# Patient Record
Sex: FEMALE | Race: WHITE | NOT HISPANIC OR LATINO
[De-identification: names, ages, dates, MRNs, and addresses within clinical notes are randomized per-mention and may not be internally consistent; named-entity substitution may affect disease eponyms.]

---

## 2018-03-08 ENCOUNTER — RESULT REVIEW (OUTPATIENT)
Age: 38
End: 2018-03-08

## 2018-03-08 ENCOUNTER — TRANSCRIPTION ENCOUNTER (OUTPATIENT)
Age: 38
End: 2018-03-08

## 2018-03-08 ENCOUNTER — INPATIENT (INPATIENT)
Facility: HOSPITAL | Age: 38
LOS: 0 days | Discharge: HOME | End: 2018-03-09
Attending: SURGERY | Admitting: SURGERY
Payer: COMMERCIAL

## 2018-03-08 VITALS
DIASTOLIC BLOOD PRESSURE: 72 MMHG | TEMPERATURE: 98 F | HEART RATE: 86 BPM | OXYGEN SATURATION: 100 % | RESPIRATION RATE: 22 BRPM | SYSTOLIC BLOOD PRESSURE: 117 MMHG

## 2018-03-08 DIAGNOSIS — K37 UNSPECIFIED APPENDICITIS: ICD-10-CM

## 2018-03-08 LAB
ALBUMIN SERPL ELPH-MCNC: 4.1 G/DL — SIGNIFICANT CHANGE UP (ref 3.3–5)
ALP SERPL-CCNC: 61 U/L — SIGNIFICANT CHANGE UP (ref 40–120)
ALT FLD-CCNC: 16 U/L — SIGNIFICANT CHANGE UP (ref 4–33)
AMYLASE P1 CFR SERPL: 45 U/L — SIGNIFICANT CHANGE UP (ref 25–125)
APPEARANCE UR: CLEAR — SIGNIFICANT CHANGE UP
APTT BLD: 29.1 SEC — SIGNIFICANT CHANGE UP (ref 27.5–37.4)
AST SERPL-CCNC: 16 U/L — SIGNIFICANT CHANGE UP (ref 4–32)
BASE EXCESS BLDV CALC-SCNC: -3.2 MMOL/L — SIGNIFICANT CHANGE UP
BASOPHILS # BLD AUTO: 0.05 K/UL — SIGNIFICANT CHANGE UP (ref 0–0.2)
BASOPHILS NFR BLD AUTO: 0.3 % — SIGNIFICANT CHANGE UP (ref 0–2)
BILIRUB SERPL-MCNC: 0.6 MG/DL — SIGNIFICANT CHANGE UP (ref 0.2–1.2)
BILIRUB UR-MCNC: NEGATIVE — SIGNIFICANT CHANGE UP
BLD GP AB SCN SERPL QL: NEGATIVE — SIGNIFICANT CHANGE UP
BLOOD GAS VENOUS - CREATININE: 0.52 MG/DL — SIGNIFICANT CHANGE UP (ref 0.5–1.3)
BLOOD UR QL VISUAL: HIGH
BUN SERPL-MCNC: 9 MG/DL — SIGNIFICANT CHANGE UP (ref 7–23)
CALCIUM SERPL-MCNC: 8.9 MG/DL — SIGNIFICANT CHANGE UP (ref 8.4–10.5)
CHLORIDE BLDV-SCNC: 109 MMOL/L — HIGH (ref 96–108)
CHLORIDE SERPL-SCNC: 103 MMOL/L — SIGNIFICANT CHANGE UP (ref 98–107)
CO2 SERPL-SCNC: 19 MMOL/L — LOW (ref 22–31)
COLOR SPEC: YELLOW — SIGNIFICANT CHANGE UP
CREAT SERPL-MCNC: 0.66 MG/DL — SIGNIFICANT CHANGE UP (ref 0.5–1.3)
EOSINOPHIL # BLD AUTO: 0.01 K/UL — SIGNIFICANT CHANGE UP (ref 0–0.5)
EOSINOPHIL NFR BLD AUTO: 0.1 % — SIGNIFICANT CHANGE UP (ref 0–6)
GAS PNL BLDV: 136 MMOL/L — SIGNIFICANT CHANGE UP (ref 136–146)
GLUCOSE BLDV-MCNC: 89 — SIGNIFICANT CHANGE UP (ref 70–99)
GLUCOSE SERPL-MCNC: 104 MG/DL — HIGH (ref 70–99)
GLUCOSE UR-MCNC: NEGATIVE — SIGNIFICANT CHANGE UP
HCG SERPL-ACNC: < 5 MIU/ML — SIGNIFICANT CHANGE UP
HCO3 BLDV-SCNC: 21 MMOL/L — SIGNIFICANT CHANGE UP (ref 20–27)
HCT VFR BLD CALC: 39.5 % — SIGNIFICANT CHANGE UP (ref 34.5–45)
HCT VFR BLDV CALC: 37.1 % — SIGNIFICANT CHANGE UP (ref 34.5–45)
HGB BLD-MCNC: 13.5 G/DL — SIGNIFICANT CHANGE UP (ref 11.5–15.5)
HGB BLDV-MCNC: 12 G/DL — SIGNIFICANT CHANGE UP (ref 11.5–15.5)
IMM GRANULOCYTES # BLD AUTO: 0.07 # — SIGNIFICANT CHANGE UP
IMM GRANULOCYTES NFR BLD AUTO: 0.5 % — SIGNIFICANT CHANGE UP (ref 0–1.5)
INR BLD: 0.98 — SIGNIFICANT CHANGE UP (ref 0.88–1.17)
KETONES UR-MCNC: SIGNIFICANT CHANGE UP
LACTATE BLDV-MCNC: 0.7 MMOL/L — SIGNIFICANT CHANGE UP (ref 0.5–2)
LEUKOCYTE ESTERASE UR-ACNC: NEGATIVE — SIGNIFICANT CHANGE UP
LIDOCAIN IGE QN: 13.7 U/L — SIGNIFICANT CHANGE UP (ref 7–60)
LYMPHOCYTES # BLD AUTO: 1.55 K/UL — SIGNIFICANT CHANGE UP (ref 1–3.3)
LYMPHOCYTES # BLD AUTO: 10.2 % — LOW (ref 13–44)
MCHC RBC-ENTMCNC: 31 PG — SIGNIFICANT CHANGE UP (ref 27–34)
MCHC RBC-ENTMCNC: 34.2 % — SIGNIFICANT CHANGE UP (ref 32–36)
MCV RBC AUTO: 90.6 FL — SIGNIFICANT CHANGE UP (ref 80–100)
MONOCYTES # BLD AUTO: 0.76 K/UL — SIGNIFICANT CHANGE UP (ref 0–0.9)
MONOCYTES NFR BLD AUTO: 5 % — SIGNIFICANT CHANGE UP (ref 2–14)
MUCOUS THREADS # UR AUTO: SIGNIFICANT CHANGE UP
NEUTROPHILS # BLD AUTO: 12.71 K/UL — HIGH (ref 1.8–7.4)
NEUTROPHILS NFR BLD AUTO: 83.9 % — HIGH (ref 43–77)
NITRITE UR-MCNC: NEGATIVE — SIGNIFICANT CHANGE UP
NRBC # FLD: 0 — SIGNIFICANT CHANGE UP
PCO2 BLDV: 37 MMHG — LOW (ref 41–51)
PH BLDV: 7.38 PH — SIGNIFICANT CHANGE UP (ref 7.32–7.43)
PH UR: 8.5 — HIGH (ref 4.6–8)
PLATELET # BLD AUTO: 292 K/UL — SIGNIFICANT CHANGE UP (ref 150–400)
PMV BLD: 11.1 FL — SIGNIFICANT CHANGE UP (ref 7–13)
PO2 BLDV: 41 MMHG — HIGH (ref 35–40)
POTASSIUM BLDV-SCNC: 3.3 MMOL/L — LOW (ref 3.4–4.5)
POTASSIUM SERPL-MCNC: 3.6 MMOL/L — SIGNIFICANT CHANGE UP (ref 3.5–5.3)
POTASSIUM SERPL-SCNC: 3.6 MMOL/L — SIGNIFICANT CHANGE UP (ref 3.5–5.3)
PROT SERPL-MCNC: 7.1 G/DL — SIGNIFICANT CHANGE UP (ref 6–8.3)
PROT UR-MCNC: 20 MG/DL — SIGNIFICANT CHANGE UP
PROTHROM AB SERPL-ACNC: 11.3 SEC — SIGNIFICANT CHANGE UP (ref 9.8–13.1)
RBC # BLD: 4.36 M/UL — SIGNIFICANT CHANGE UP (ref 3.8–5.2)
RBC # FLD: 12.6 % — SIGNIFICANT CHANGE UP (ref 10.3–14.5)
RBC CASTS # UR COMP ASSIST: >50 — HIGH (ref 0–?)
RH IG SCN BLD-IMP: POSITIVE — SIGNIFICANT CHANGE UP
SAO2 % BLDV: 75.2 % — SIGNIFICANT CHANGE UP (ref 60–85)
SODIUM SERPL-SCNC: 137 MMOL/L — SIGNIFICANT CHANGE UP (ref 135–145)
SP GR SPEC: 1.02 — SIGNIFICANT CHANGE UP (ref 1–1.04)
SQUAMOUS # UR AUTO: SIGNIFICANT CHANGE UP
UROBILINOGEN FLD QL: NORMAL MG/DL — SIGNIFICANT CHANGE UP
WBC # BLD: 15.15 K/UL — HIGH (ref 3.8–10.5)
WBC # FLD AUTO: 15.15 K/UL — HIGH (ref 3.8–10.5)
WBC UR QL: SIGNIFICANT CHANGE UP (ref 0–?)

## 2018-03-08 PROCEDURE — 88304 TISSUE EXAM BY PATHOLOGIST: CPT | Mod: 26

## 2018-03-08 PROCEDURE — 74177 CT ABD & PELVIS W/CONTRAST: CPT | Mod: 26

## 2018-03-08 PROCEDURE — 44970 LAPAROSCOPY APPENDECTOMY: CPT

## 2018-03-08 RX ORDER — ALPRAZOLAM 0.25 MG
1 TABLET ORAL
Qty: 0 | Refills: 0 | COMMUNITY

## 2018-03-08 RX ORDER — MORPHINE SULFATE 50 MG/1
4 CAPSULE, EXTENDED RELEASE ORAL ONCE
Qty: 0 | Refills: 0 | Status: DISCONTINUED | OUTPATIENT
Start: 2018-03-08 | End: 2018-03-08

## 2018-03-08 RX ORDER — ONDANSETRON 8 MG/1
4 TABLET, FILM COATED ORAL ONCE
Qty: 0 | Refills: 0 | Status: COMPLETED | OUTPATIENT
Start: 2018-03-08 | End: 2018-03-08

## 2018-03-08 RX ORDER — SERTRALINE 25 MG/1
1 TABLET, FILM COATED ORAL
Qty: 0 | Refills: 0 | COMMUNITY

## 2018-03-08 RX ORDER — IBUPROFEN 200 MG
1 TABLET ORAL
Qty: 0 | Refills: 0 | COMMUNITY

## 2018-03-08 RX ORDER — NORETHINDRONE AND ETHINYL ESTRADIOL 0.4-0.035
1 KIT ORAL
Qty: 0 | Refills: 0 | COMMUNITY

## 2018-03-08 RX ORDER — SODIUM CHLORIDE 9 MG/ML
1000 INJECTION INTRAMUSCULAR; INTRAVENOUS; SUBCUTANEOUS ONCE
Qty: 0 | Refills: 0 | Status: COMPLETED | OUTPATIENT
Start: 2018-03-08 | End: 2018-03-08

## 2018-03-08 RX ORDER — HEPARIN SODIUM 5000 [USP'U]/ML
5000 INJECTION INTRAVENOUS; SUBCUTANEOUS EVERY 8 HOURS
Qty: 0 | Refills: 0 | Status: DISCONTINUED | OUTPATIENT
Start: 2018-03-08 | End: 2018-03-09

## 2018-03-08 RX ORDER — SODIUM CHLORIDE 9 MG/ML
1000 INJECTION, SOLUTION INTRAVENOUS
Qty: 0 | Refills: 0 | Status: DISCONTINUED | OUTPATIENT
Start: 2018-03-08 | End: 2018-03-09

## 2018-03-08 RX ORDER — ALBUTEROL 90 UG/1
2 AEROSOL, METERED ORAL
Qty: 0 | Refills: 0 | COMMUNITY

## 2018-03-08 RX ORDER — OXYCODONE AND ACETAMINOPHEN 5; 325 MG/1; MG/1
1 TABLET ORAL EVERY 4 HOURS
Qty: 0 | Refills: 0 | Status: DISCONTINUED | OUTPATIENT
Start: 2018-03-08 | End: 2018-03-09

## 2018-03-08 RX ORDER — ONDANSETRON 8 MG/1
4 TABLET, FILM COATED ORAL ONCE
Qty: 0 | Refills: 0 | Status: COMPLETED | OUTPATIENT
Start: 2018-03-08 | End: 2018-03-09

## 2018-03-08 RX ORDER — HYDROMORPHONE HYDROCHLORIDE 2 MG/ML
0.5 INJECTION INTRAMUSCULAR; INTRAVENOUS; SUBCUTANEOUS
Qty: 0 | Refills: 0 | Status: DISCONTINUED | OUTPATIENT
Start: 2018-03-08 | End: 2018-03-09

## 2018-03-08 RX ORDER — PIPERACILLIN AND TAZOBACTAM 4; .5 G/20ML; G/20ML
3.38 INJECTION, POWDER, LYOPHILIZED, FOR SOLUTION INTRAVENOUS ONCE
Qty: 0 | Refills: 0 | Status: COMPLETED | OUTPATIENT
Start: 2018-03-08 | End: 2018-03-08

## 2018-03-08 RX ADMIN — MORPHINE SULFATE 4 MILLIGRAM(S): 50 CAPSULE, EXTENDED RELEASE ORAL at 18:13

## 2018-03-08 RX ADMIN — MORPHINE SULFATE 4 MILLIGRAM(S): 50 CAPSULE, EXTENDED RELEASE ORAL at 16:28

## 2018-03-08 RX ADMIN — HYDROMORPHONE HYDROCHLORIDE 0.5 MILLIGRAM(S): 2 INJECTION INTRAMUSCULAR; INTRAVENOUS; SUBCUTANEOUS at 23:30

## 2018-03-08 RX ADMIN — SODIUM CHLORIDE 75 MILLILITER(S): 9 INJECTION, SOLUTION INTRAVENOUS at 23:21

## 2018-03-08 RX ADMIN — HEPARIN SODIUM 5000 UNIT(S): 5000 INJECTION INTRAVENOUS; SUBCUTANEOUS at 23:19

## 2018-03-08 RX ADMIN — MORPHINE SULFATE 4 MILLIGRAM(S): 50 CAPSULE, EXTENDED RELEASE ORAL at 12:05

## 2018-03-08 RX ADMIN — MORPHINE SULFATE 4 MILLIGRAM(S): 50 CAPSULE, EXTENDED RELEASE ORAL at 13:10

## 2018-03-08 RX ADMIN — MORPHINE SULFATE 4 MILLIGRAM(S): 50 CAPSULE, EXTENDED RELEASE ORAL at 17:58

## 2018-03-08 RX ADMIN — PIPERACILLIN AND TAZOBACTAM 200 GRAM(S): 4; .5 INJECTION, POWDER, LYOPHILIZED, FOR SOLUTION INTRAVENOUS at 15:44

## 2018-03-08 RX ADMIN — MORPHINE SULFATE 4 MILLIGRAM(S): 50 CAPSULE, EXTENDED RELEASE ORAL at 15:30

## 2018-03-08 RX ADMIN — ONDANSETRON 4 MILLIGRAM(S): 8 TABLET, FILM COATED ORAL at 11:46

## 2018-03-08 RX ADMIN — HYDROMORPHONE HYDROCHLORIDE 0.5 MILLIGRAM(S): 2 INJECTION INTRAMUSCULAR; INTRAVENOUS; SUBCUTANEOUS at 23:16

## 2018-03-08 RX ADMIN — MORPHINE SULFATE 4 MILLIGRAM(S): 50 CAPSULE, EXTENDED RELEASE ORAL at 11:46

## 2018-03-08 RX ADMIN — SODIUM CHLORIDE 1000 MILLILITER(S): 9 INJECTION INTRAMUSCULAR; INTRAVENOUS; SUBCUTANEOUS at 11:46

## 2018-03-08 NOTE — H&P ADULT - NSHPPHYSICALEXAM_GEN_ALL_CORE
PHYSICAL EXAM:  GENERAL: NAD, well-developed  HEAD:  Atraumatic, Normocephalic  EYES: EOMI, PERRLA, conjunctiva and sclera clear  NECK: Supple, No JVD  CHEST/LUNG: Clear to auscultation bilaterally; No wheeze  HEART: Regular rate and rhythm; No murmurs, rubs, or gallops  ABDOMEN: Soft, tender in the suprapubic area and RLQ  EXTREMITIES:  2+ Peripheral Pulses, No clubbing, cyanosis, or edema  PSYCH: AAOx3  NEUROLOGY: non-focal  SKIN: No rashes or lesions

## 2018-03-08 NOTE — H&P ADULT - HISTORY OF PRESENT ILLNESS
Patient has had pain since 11pm last night and nausea with gagging and small nonbilious emesis. Pain precedes nausea, no fevers or chills.  No prior episodes of pain.  She has no complaints of dysuria, urgency Patient is a 36 yo female with no spmh presenting with abdominal pain since 11pm last night and nausea with gagging and small nonbilious emesis. Pain precedes nausea, no fevers or chills.  No prior episodes of pain.  She has no complaints of dysuria, urgency.  She has a hx of hematuria which previous workup revealed nothing significant.  She has had no fevers, chills, recent travel, or sick contacts.

## 2018-03-08 NOTE — ED ADULT NURSE NOTE - OBJECTIVE STATEMENT
Patient presented to ED A&O x4, with c/o abdominal pain since last night, denies any N/V/D, Blood work drawn and sent to lab.  ER physician evaluated patient, medication ordered and administered.  Family present at the bedside. Will continue to monitor patient closely. Arlet GRACE

## 2018-03-08 NOTE — ED PROVIDER NOTE - NONTENDER LOCATION
left lower quadrant/umbilical/suprapubic/periumbilical/left costovertebral angle/left upper quadrant/right upper quadrant/right costovertebral angle/right lower quadrant

## 2018-03-08 NOTE — DISCHARGE NOTE ADULT - CARE PROVIDER_API CALL
Raffi Duque), Surgery; Surgical Critical Care  [address] [place] [address]  Phone: [phone]  Fax: [phone]

## 2018-03-08 NOTE — ED PROVIDER NOTE - OBJECTIVE STATEMENT
36 y/o F with h/o ocp use and irreg periods p/w sudden onset of diffuse abd pain which started in luq and then wrapped around periumbilical region, since 11 pm last night , no nausea, vomiting, diarrhea, fever, chills, dysuria, hematuria. Pt denies nay pelvic pain, denies pregnancy. Pt states that however, pain is equivalent to labor pain. No chest pain, fall or trauma. Pt got very anxious this morning and took some xanax without relief

## 2018-03-08 NOTE — H&P ADULT - NSHPREVIEWOFSYSTEMS_GEN_ALL_CORE
Constitutional: No fever, fatigue or weight loss.  Skin: No rash.  Eyes: No recent vision problems or eye pain.  ENT: No congestion, ear pain, or sore throat.  Endocrine: No thyroid problems.  Cardiovascular: No chest pain or palpation.  Respiratory: No cough, shortness of breath, congestion, or wheezing.  Gastrointestinal: as above  Genitourinary: No dysuria.  Musculoskeletal: No joint swelling.  Neurologic: No headache.

## 2018-03-08 NOTE — H&P ADULT - NSHPLABSRESULTS_GEN_ALL_CORE
13.5   15.15 )-----------( 292      ( 08 Mar 2018 11:40 )             39.5       03-08    137  |  103  |  9   ----------------------------<  104<H>  3.6   |  19<L>  |  0.66    Ca    8.9      08 Mar 2018 11:40    TPro  7.1  /  Alb  4.1  /  TBili  0.6  /  DBili  x   /  AST  16  /  ALT  16  /  AlkPhos  61  03-08      < from: CT Abdomen and Pelvis w/ IV Cont (03.08.18 @ 13:02) >    BOWEL: Normal in course and caliber without obstruction. The appendix is   borderline in size measuring 6 to 7 mm with mild mucosal enhancement but   no adjacent fatty stranding.  PERITONEUM/RETROPERITONEUM: No pneumoperitoneum, ascites, or   lymphadenopathy.  VESSELS:  No evidenceof aortic aneurysm.      BONES/SOFT TISSUES: No abnormality.    IMPRESSION:  Borderline appearance of appendix. If patient is focally tender in the   right lower quadrant, then early acute appendicitis is a consideration.  No other abnormality in theabdomen and pelvis.      < end of copied text >

## 2018-03-08 NOTE — BRIEF OPERATIVE NOTE - PROCEDURE
<<-----Click on this checkbox to enter Procedure Laparoscopic appendectomy  03/08/2018    Active  JANICE

## 2018-03-08 NOTE — H&P ADULT - ASSESSMENT
Patient is a 37 year old F with early acute appendicitis  -IV antibiotics  -preop labs  -npo/IVF  -consented for laparoscopic appendectomy    C Brennan SABILLON PGYII [id number] B Team Sx

## 2018-03-08 NOTE — ED PROVIDER NOTE - MEDICAL DECISION MAKING DETAILS
given the sudden onset of pain and severity wihtout focal tenderness, ddx ncludes kidney stone vs avm rupture vs ovarin cyst rupture, will check labs, lipase, ua, ucg and do ct abd / pelvis with iv contrast,, given morphine for pain relief

## 2018-03-08 NOTE — DISCHARGE NOTE ADULT - HOSPITAL COURSE
39 y/o F with no PMHx who presented with RLQ pain and was found to have signs concerning for acute appendicitis on CT scan. Patient was taken to the OR on 3/8 and underwent an uncomplicated laparoscopic appendectomy and tolerated it well. Following the procedure her pain was well controlled, she was tolerating PO intake, was able to ambulate independently and was deemed stable for discharge. Patient to follow up with Dr. Duque in 2 weeks.

## 2018-03-08 NOTE — DISCHARGE NOTE ADULT - PATIENT PORTAL LINK FT
You can access the [facility] Patient Portal, offered by [facility], by registering with the following website: [email or web address]

## 2018-03-08 NOTE — DISCHARGE NOTE ADULT - MEDICATION SUMMARY - MEDICATIONS TO TAKE
I will START or STAY ON the medications listed below when I get home from the hospital:    Advil 200 mg oral tablet  -- 1 tab(s) by mouth every 6 hours, As Needed  -- Indication: For mild pain    Percocet 5/325 oral tablet  -- 1 tab(s) by mouth every 6 hours, As Needed -for severe pain MDD:4  -- Caution federal law prohibits the transfer of this drug to any person other  than the person for whom it was prescribed.  May cause drowsiness.  Alcohol may intensify this effect.  Use care when operating dangerous machinery.  This prescription cannot be refilled.  This product contains acetaminophen.  Do not use  with any other product containing acetaminophen to prevent possible liver damage.  Using more of this medication than prescribed may cause serious breathing problems.    -- Indication: For severe pain    Zoloft 25 mg oral tablet  -- 1 tab(s) by mouth every other day  -- Indication: For home med    ALPRAZolam 0.5 mg oral tablet  -- 1 tab(s) by mouth once a day, As Needed  -- Indication: For home med    albuterol 90 mcg/inh inhalation aerosol  -- 2 puff(s) inhaled 4 times a day, As Needed  -- Indication: For home med    Minastrin 24 Fe oral tablet, chewable  -- 1 tab(s) by mouth once a day  -- Indication: For home med

## 2018-03-08 NOTE — DISCHARGE NOTE ADULT - CARE PLAN
Principal Discharge DX:	Acute appendicitis, unspecified acute appendicitis type  Goal:	Recover from surgery  Assessment and plan of treatment:	Follow up with Dr. Duque 2 weeks from surgery. Remove outer dressing after 24 hours. Can shower then allowing water to run over steri strips, pat dry afterwards and leave on until they fall off. Do not submerge incision. Pain medications have been sent to your pharmacy. Principal Discharge DX:	Acute appendicitis, unspecified acute appendicitis type  Goal:	Recover from surgery  Assessment and plan of treatment:	Follow up with Dr. Duque 2 weeks from surgery. Can shower after 24 hours. Do not submerge incision. Pain medications have been sent to your pharmacy.

## 2018-03-08 NOTE — DISCHARGE NOTE ADULT - PLAN OF CARE
Recover from surgery Follow up with Dr. Duque 2 weeks from surgery. Remove outer dressing after 24 hours. Can shower then allowing water to run over steri strips, pat dry afterwards and leave on until they fall off. Do not submerge incision. Pain medications have been sent to your pharmacy. Follow up with Dr. Duque 2 weeks from surgery. Can shower after 24 hours. Do not submerge incision. Pain medications have been sent to your pharmacy.

## 2018-03-09 ENCOUNTER — TRANSCRIPTION ENCOUNTER (OUTPATIENT)
Age: 38
End: 2018-03-09

## 2018-03-09 VITALS
OXYGEN SATURATION: 99 % | HEART RATE: 78 BPM | DIASTOLIC BLOOD PRESSURE: 72 MMHG | RESPIRATION RATE: 12 BRPM | SYSTOLIC BLOOD PRESSURE: 114 MMHG | TEMPERATURE: 98 F

## 2018-03-09 RX ADMIN — ONDANSETRON 4 MILLIGRAM(S): 8 TABLET, FILM COATED ORAL at 00:33

## 2018-03-22 ENCOUNTER — APPOINTMENT (OUTPATIENT)
Dept: TRAUMA SURGERY | Facility: CLINIC | Age: 38
End: 2018-03-22
Payer: COMMERCIAL

## 2018-03-22 VITALS
DIASTOLIC BLOOD PRESSURE: 82 MMHG | WEIGHT: 122 LBS | BODY MASS INDEX: 19.61 KG/M2 | TEMPERATURE: 98.5 F | HEIGHT: 66 IN | HEART RATE: 94 BPM | SYSTOLIC BLOOD PRESSURE: 118 MMHG

## 2018-03-22 PROCEDURE — 99024 POSTOP FOLLOW-UP VISIT: CPT

## 2018-04-12 ENCOUNTER — APPOINTMENT (OUTPATIENT)
Dept: TRAUMA SURGERY | Facility: CLINIC | Age: 38
End: 2018-04-12
Payer: COMMERCIAL

## 2018-04-12 VITALS
TEMPERATURE: 98 F | HEIGHT: 66 IN | WEIGHT: 125 LBS | HEART RATE: 93 BPM | SYSTOLIC BLOOD PRESSURE: 107 MMHG | DIASTOLIC BLOOD PRESSURE: 77 MMHG | BODY MASS INDEX: 20.09 KG/M2

## 2018-04-12 PROCEDURE — 99024 POSTOP FOLLOW-UP VISIT: CPT

## 2021-04-22 ENCOUNTER — RESULT REVIEW (OUTPATIENT)
Age: 41
End: 2021-04-22

## 2021-11-11 ENCOUNTER — APPOINTMENT (OUTPATIENT)
Dept: ULTRASOUND IMAGING | Facility: CLINIC | Age: 41
End: 2021-11-11
Payer: COMMERCIAL

## 2021-11-11 ENCOUNTER — OUTPATIENT (OUTPATIENT)
Dept: OUTPATIENT SERVICES | Facility: HOSPITAL | Age: 41
LOS: 1 days | End: 2021-11-11
Payer: COMMERCIAL

## 2021-11-11 DIAGNOSIS — Z00.8 ENCOUNTER FOR OTHER GENERAL EXAMINATION: ICD-10-CM

## 2021-11-11 PROCEDURE — 76770 US EXAM ABDO BACK WALL COMP: CPT | Mod: 26

## 2021-11-11 PROCEDURE — 76770 US EXAM ABDO BACK WALL COMP: CPT

## 2022-03-29 ENCOUNTER — APPOINTMENT (OUTPATIENT)
Dept: ORTHOPEDIC SURGERY | Facility: CLINIC | Age: 42
End: 2022-03-29
Payer: COMMERCIAL

## 2022-03-29 ENCOUNTER — NON-APPOINTMENT (OUTPATIENT)
Age: 42
End: 2022-03-29

## 2022-03-29 VITALS
HEIGHT: 66 IN | WEIGHT: 135 LBS | SYSTOLIC BLOOD PRESSURE: 111 MMHG | BODY MASS INDEX: 21.69 KG/M2 | DIASTOLIC BLOOD PRESSURE: 73 MMHG | HEART RATE: 75 BPM

## 2022-03-29 DIAGNOSIS — M25.569 PAIN IN UNSPECIFIED KNEE: ICD-10-CM

## 2022-03-29 PROCEDURE — 99202 OFFICE O/P NEW SF 15 MIN: CPT | Mod: 25

## 2022-03-29 PROCEDURE — 73562 X-RAY EXAM OF KNEE 3: CPT | Mod: 50

## 2022-03-29 PROCEDURE — 20610 DRAIN/INJ JOINT/BURSA W/O US: CPT | Mod: 50

## 2022-03-29 RX ORDER — LIDOCAINE HYDROCHLORIDE 10 MG/ML
1 INJECTION, SOLUTION INFILTRATION; PERINEURAL
Refills: 0 | Status: COMPLETED | OUTPATIENT
Start: 2022-03-29

## 2022-03-29 RX ORDER — METHYLPRED ACET/NACL,ISO-OS/PF 80 MG/ML
80 VIAL (ML) INJECTION
Qty: 1 | Refills: 0 | Status: COMPLETED | OUTPATIENT
Start: 2022-03-29

## 2022-03-29 RX ORDER — NORETHINDRONE 0.35 MG/1
0.35 TABLET ORAL
Qty: 84 | Refills: 0 | Status: DISCONTINUED | COMMUNITY
Start: 2021-08-02

## 2022-03-29 RX ORDER — METHYLPREDNISOLONE 4 MG/1
4 TABLET ORAL
Qty: 21 | Refills: 0 | Status: DISCONTINUED | COMMUNITY
Start: 2022-01-14

## 2022-03-29 RX ORDER — RIMEGEPANT SULFATE 75 MG/75MG
75 TABLET, ORALLY DISINTEGRATING ORAL
Qty: 8 | Refills: 0 | Status: ACTIVE | COMMUNITY
Start: 2022-03-15

## 2022-03-29 RX ORDER — CLOBETASOL PROPIONATE 0.5 MG/G
0.05 OINTMENT TOPICAL
Qty: 15 | Refills: 0 | Status: ACTIVE | COMMUNITY
Start: 2021-12-27

## 2022-03-29 RX ADMIN — LIDOCAINE HYDROCHLORIDE 3 %: 10 INJECTION, SOLUTION INFILTRATION; PERINEURAL at 00:00

## 2022-03-29 RX ADMIN — METHYLPREDNISOLONE ACETATE 1 MG/ML: 80 INJECTION, SUSPENSION INTRA-ARTICULAR; INTRALESIONAL; INTRAMUSCULAR; SOFT TISSUE at 00:00

## 2022-04-26 ENCOUNTER — APPOINTMENT (OUTPATIENT)
Dept: ORTHOPEDIC SURGERY | Facility: CLINIC | Age: 42
End: 2022-04-26
Payer: COMMERCIAL

## 2022-04-26 VITALS — SYSTOLIC BLOOD PRESSURE: 113 MMHG | DIASTOLIC BLOOD PRESSURE: 82 MMHG | HEART RATE: 71 BPM

## 2022-04-26 PROCEDURE — 99213 OFFICE O/P EST LOW 20 MIN: CPT

## 2022-04-26 RX ORDER — HYLAN G-F 20 16MG/2ML
48 SYRINGE (ML) INTRAARTICULAR
Qty: 2 | Refills: 0 | Status: ACTIVE | OUTPATIENT
Start: 2022-04-26

## 2022-04-27 ENCOUNTER — NON-APPOINTMENT (OUTPATIENT)
Age: 42
End: 2022-04-27

## 2022-05-23 ENCOUNTER — RX RENEWAL (OUTPATIENT)
Age: 42
End: 2022-05-23

## 2022-05-23 RX ORDER — MELOXICAM 15 MG/1
15 TABLET ORAL
Qty: 30 | Refills: 0 | Status: ACTIVE | COMMUNITY
Start: 2022-04-26 | End: 1900-01-01

## 2022-10-10 ENCOUNTER — APPOINTMENT (OUTPATIENT)
Dept: PSYCHIATRY | Facility: CLINIC | Age: 42
End: 2022-10-10

## 2022-10-10 PROCEDURE — 99205 OFFICE O/P NEW HI 60 MIN: CPT

## 2022-10-10 NOTE — FAMILY HISTORY
[FreeTextEntry1] : Patient born in [facility] mother age 80 Father  when the patient was 1 of Marfan's.  Brother  family of [place]oan origin does not know much about family psychiatric history is nobody ever talked about feelings and they did not believe in medication.  No drug and alcohol history in the family no abuse history growing up.

## 2022-10-10 NOTE — SOCIAL HISTORY
[FreeTextEntry1] : Patient grew up in [place].  She went to [employer] graduating in 1996.  High school was horrible it was a city school it was very dangerous she had a lot of friends she played volleyball dance and soccer she was a good student.  She then went to [employer] then transferred to [employer] graduating in 2000 with a degree in media in English.  Her GPA was 3.8.  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  Patient was  in 2006.  She then decided she did not want to be in television anymore went back to [employer] where she got a masters in education and then a degree in special education she also worked as a  to put herself through school.

## 2022-10-10 NOTE — PHYSICAL EXAM
[None] : none [Cooperative] : cooperative [Anxious] : anxious [Clear] : clear [Linear/Goal Directed] : linear/goal directed [Average] : average [WNL] : within normal limits [FreeTextEntry8] : Mild dysphoria [de-identified] : Constricted

## 2022-10-10 NOTE — HISTORY OF PRESENT ILLNESS
[FreeTextEntry1] : Patient is a 42-year-old female, , works as a teacher also coaches dance.  Lives at home with  age 45 who is a TV .  They have an 11-year-old daughter and a 13-year-old son.  13-year-old son suffers from anxiety currently being treated with Prozac.  Patient states that she has Graves' disease.  Right now it is in remission but there are times when she experiences symptoms of depression and anxiety with it now she is experiencing the depression and anxiety all the time the anxiety usually involves having racing thoughts not being able to sleep feeling tired being agitated yelling screaming tachycardia feeling itchiness over her whole body having headaches GI upset dizziness lightheadedness.  Patient states she feels like she is always waiting for the next palm to drop.  The depression she feels like she cannot function she puts on a good show gets through the day and then comes home and falls apart.  There are multiple stressors in her life her brother  several years ago for Marfan's disease.  Right now she is taking care of her mother who has bladder cancer her mother might have to move into the house with them so she is aware of that and is ready for all the stressors that that entails.  Her sons being bullied at school that is always been a problem.  Her mother-in-law  last year from COVID.  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  She works from 7:00 till 5:00.  After that she will come home have dinner she will take the kids out to their different activities she will teach dance when she comes home boxes little television goes to bed about 10:00 at night.  He can take anywhere from 3 to 4 hours to fall asleep.  She has been taking melatonin and Tylenol PM and occasional Xanax.  When she falls asleep she awakens on and off throughout the night.  Appetite normal height 5 feet 6 inches weight 130 pounds. [FreeTextEntry2] : Patient states she is always been somebody who just pushes through things.  There is a lot of stress growing up because her brother had Marfan's disease.  The first time she ever experienced any symptoms was somewhere around 2007.  There were living in an apartment with her father-in-law he was very intrusive felt that he like to spend time along with his son he made her feel like an intruder.  She was very angry yelling a lot she was having a lot of anxiety and panic and depression the first time she was ever treated was somewhere around 2017.  At that point she was placed on Zoloft 25 mg increased to 50 mg along with a as needed Xanax.  She was on the medication for about a year and a half it helped her cope with things she felt a little bit numb.

## 2022-10-10 NOTE — DISCUSSION/SUMMARY
[FreeTextEntry1] : 42-year-old female with anxiety panic depression.  Alternative strategies reviewed.  Begin Lexapro 10 mg.  Follow-up in 4 weeks.  Gabapentin 300 to 600 mg at night for sleep and anxiety.

## 2022-11-07 ENCOUNTER — APPOINTMENT (OUTPATIENT)
Dept: PSYCHIATRY | Facility: CLINIC | Age: 42
End: 2022-11-07

## 2022-11-07 PROCEDURE — 99214 OFFICE O/P EST MOD 30 MIN: CPT | Mod: 95

## 2022-11-07 NOTE — PHYSICAL EXAM
[None] : none [Cooperative] : cooperative [Anxious] : anxious [Clear] : clear [Linear/Goal Directed] : linear/goal directed [Average] : average [WNL] : within normal limits [FreeTextEntry8] : Mild dysphoria [de-identified] : Constricted

## 2022-11-07 NOTE — DISCUSSION/SUMMARY
[FreeTextEntry1] : 42-year-old female with anxiety panic depression.  Plan continue Lexapro 10 mg Gabapentin 300 to 600 mg at night for sleep and anxiety Xanax 0.5 mg as needed.  Follow-up in 3 months.

## 2022-11-07 NOTE — FAMILY HISTORY
[FreeTextEntry1] : Patient born in [facility] mother age 80 Father  when the patient was 1 of Marfan's.  Brother  family of [place] Islander origin does not know much about family psychiatric history is nobody ever talked about feelings and they did not believe in medication.  No drug and alcohol history in the family no abuse history growing up.

## 2022-11-07 NOTE — SOCIAL HISTORY
[FreeTextEntry1] : Patient grew up in [place].  She went to [employer] school graduating in 1996.  High school was horrible it was a city school it was very dangerous she had a lot of friends she played volleyball dance and soccer she was a good student.  She then went to [employer] then transferred to [employer] graduating in 2000 with a degree in media in English.  Her GPA was 3.8.  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  Patient was  in 2006.  She then decided she did not want to be in television anymore went back to [employer] where she got a masters in education and then a degree in special education she also worked as a  to put herself through school.

## 2022-12-09 RX ORDER — HYALURONATE SODIUM, STABILIZED 60 MG/3 ML
60 SYRINGE (ML) INTRAARTICULAR
Qty: 2 | Refills: 0 | Status: ACTIVE | OUTPATIENT
Start: 2022-04-27

## 2022-12-12 ENCOUNTER — NON-APPOINTMENT (OUTPATIENT)
Age: 42
End: 2022-12-12

## 2022-12-12 ENCOUNTER — APPOINTMENT (OUTPATIENT)
Dept: ORTHOPEDIC SURGERY | Facility: CLINIC | Age: 42
End: 2022-12-12

## 2022-12-12 VITALS — DIASTOLIC BLOOD PRESSURE: 84 MMHG | SYSTOLIC BLOOD PRESSURE: 124 MMHG | HEART RATE: 80 BPM

## 2022-12-12 PROCEDURE — 20610 DRAIN/INJ JOINT/BURSA W/O US: CPT | Mod: 50

## 2022-12-12 RX ADMIN — Medication %: at 00:00

## 2022-12-12 RX ADMIN — Medication 0 MG/3ML: at 00:00

## 2022-12-13 RX ORDER — LIDOCAINE HYDROCHLORIDE 10 MG/ML
1 INJECTION, SOLUTION INFILTRATION; PERINEURAL
Refills: 0 | Status: COMPLETED | OUTPATIENT
Start: 2022-12-12

## 2022-12-14 RX ORDER — HYALURONATE SODIUM, STABILIZED 60 MG/3 ML
60 SYRINGE (ML) INTRAARTICULAR
Refills: 0 | Status: COMPLETED | OUTPATIENT
Start: 2022-12-12 | End: 2022-12-12

## 2022-12-15 RX ORDER — HYALURONATE SODIUM, STABILIZED 60 MG/3 ML
60 SYRINGE (ML) INTRAARTICULAR
Refills: 0 | Status: COMPLETED | OUTPATIENT
Start: 2022-12-12

## 2023-03-13 ENCOUNTER — APPOINTMENT (OUTPATIENT)
Dept: PSYCHIATRY | Facility: CLINIC | Age: 43
End: 2023-03-13
Payer: COMMERCIAL

## 2023-03-13 PROCEDURE — 99214 OFFICE O/P EST MOD 30 MIN: CPT | Mod: 95

## 2023-03-13 RX ORDER — ALPRAZOLAM 0.5 MG/1
0.5 TABLET ORAL
Qty: 60 | Refills: 0 | Status: ACTIVE | COMMUNITY
Start: 2022-11-07 | End: 1900-01-01

## 2023-03-13 NOTE — DISCUSSION/SUMMARY
[FreeTextEntry1] : 42-year-old female with anxiety panic depression.  Plan continue Lexapro 10 mg Xanax 0.5 mg as needed.  Follow-up in 6 months.

## 2023-03-13 NOTE — REASON FOR VISIT
[Patient preference] : as per patient preference [Telehealth (audio & video) - Individual/Group] : This visit was provided via telehealth using real-time 2-way audio visual technology. [Medical Office: ([facility])___] : The provider was located at the medical office in [unfilled]. [Home] : The patient, [unfilled], was located at home, [unfilled], at the time of the visit. [Patient] : Patient [FreeTextEntry1] : Anxiety

## 2023-03-13 NOTE — FAMILY HISTORY
[FreeTextEntry1] : Patient born in [facility] mother age 80 Father  when the patient was 1 of Marfan's.  Brother  family of [place] origin does not know much about family psychiatric history is nobody ever talked about feelings and they did not believe in medication.  No drug and alcohol history in the family no abuse history growing up.

## 2023-03-13 NOTE — PHYSICAL EXAM
[None] : none [Cooperative] : cooperative [Anxious] : anxious [Clear] : clear [Linear/Goal Directed] : linear/goal directed [Average] : average [WNL] : within normal limits [FreeTextEntry8] : Mild dysphoria [de-identified] : Constricted

## 2023-03-13 NOTE — SOCIAL HISTORY
[FreeTextEntry1] : Patient grew up in [place].  She went to [employer] graduating in 1996.  High school was horrible it was a city school it was very dangerous she had a lot of friends she played volleyball dance and soccer she was a good student.  She then went to [employer] then transferred to [employer] graduating in 2000 with a degree in media in English.  Her GPA was 3.8.  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  Patient was  in 2006.  She then decided she did not want to be in television anymore went back to [employer] where she got a masters in education and then a degree in special education she also worked as a  to put herself through school.

## 2023-06-20 NOTE — ED PROVIDER NOTE - ENMT, MLM
Pt will meet >75% of estimated protein-energy needs via tolerated route 
Airway patent, Nasal mucosa clear. Mouth with normal mucosa. Throat has no vesicles, no oropharyngeal exudates and uvula is midline.

## 2023-06-22 ENCOUNTER — EMERGENCY (EMERGENCY)
Facility: HOSPITAL | Age: 43
LOS: 1 days | Discharge: HOME | End: 2023-06-22
Attending: EMERGENCY MEDICINE | Admitting: EMERGENCY MEDICINE
Payer: COMMERCIAL

## 2023-06-22 VITALS
OXYGEN SATURATION: 100 % | HEART RATE: 160 BPM | DIASTOLIC BLOOD PRESSURE: 71 MMHG | SYSTOLIC BLOOD PRESSURE: 120 MMHG | TEMPERATURE: 98 F | RESPIRATION RATE: 18 BRPM

## 2023-06-22 LAB
ALBUMIN SERPL ELPH-MCNC: 3.9 G/DL — SIGNIFICANT CHANGE UP (ref 3.3–5)
ALP SERPL-CCNC: 93 U/L — SIGNIFICANT CHANGE UP (ref 40–120)
ALT FLD-CCNC: 27 U/L — SIGNIFICANT CHANGE UP (ref 4–33)
ANION GAP SERPL CALC-SCNC: 15 MMOL/L — HIGH (ref 7–14)
APTT BLD: 28.2 SEC — SIGNIFICANT CHANGE UP (ref 27–36.3)
AST SERPL-CCNC: 22 U/L — SIGNIFICANT CHANGE UP (ref 4–32)
BASE EXCESS BLDV CALC-SCNC: -0.7 MMOL/L — SIGNIFICANT CHANGE UP (ref -2–3)
BASOPHILS # BLD AUTO: 0.05 K/UL — SIGNIFICANT CHANGE UP (ref 0–0.2)
BASOPHILS NFR BLD AUTO: 0.6 % — SIGNIFICANT CHANGE UP (ref 0–2)
BILIRUB SERPL-MCNC: 0.2 MG/DL — SIGNIFICANT CHANGE UP (ref 0.2–1.2)
BLD GP AB SCN SERPL QL: NEGATIVE — SIGNIFICANT CHANGE UP
BLOOD GAS VENOUS COMPREHENSIVE RESULT: SIGNIFICANT CHANGE UP
BUN SERPL-MCNC: 15 MG/DL — SIGNIFICANT CHANGE UP (ref 7–23)
CALCIUM SERPL-MCNC: 9.6 MG/DL — SIGNIFICANT CHANGE UP (ref 8.4–10.5)
CHLORIDE BLDV-SCNC: 106 MMOL/L — SIGNIFICANT CHANGE UP (ref 96–108)
CHLORIDE SERPL-SCNC: 107 MMOL/L — SIGNIFICANT CHANGE UP (ref 98–107)
CO2 BLDV-SCNC: 24.5 MMOL/L — SIGNIFICANT CHANGE UP (ref 22–26)
CO2 SERPL-SCNC: 18 MMOL/L — LOW (ref 22–31)
CREAT SERPL-MCNC: 0.48 MG/DL — LOW (ref 0.5–1.3)
EGFR: 120 ML/MIN/1.73M2 — SIGNIFICANT CHANGE UP
EOSINOPHIL # BLD AUTO: 0.07 K/UL — SIGNIFICANT CHANGE UP (ref 0–0.5)
EOSINOPHIL NFR BLD AUTO: 0.9 % — SIGNIFICANT CHANGE UP (ref 0–6)
GAS PNL BLDV: 136 MMOL/L — SIGNIFICANT CHANGE UP (ref 136–145)
GLUCOSE BLDV-MCNC: 117 MG/DL — HIGH (ref 70–99)
GLUCOSE SERPL-MCNC: 124 MG/DL — HIGH (ref 70–99)
HCG SERPL-ACNC: <1 MIU/ML — SIGNIFICANT CHANGE UP
HCO3 BLDV-SCNC: 23 MMOL/L — SIGNIFICANT CHANGE UP (ref 22–29)
HCT VFR BLD CALC: 39.8 % — SIGNIFICANT CHANGE UP (ref 34.5–45)
HCT VFR BLDA CALC: 39 % — SIGNIFICANT CHANGE UP (ref 34.5–46.5)
HGB BLD CALC-MCNC: 13.1 G/DL — SIGNIFICANT CHANGE UP (ref 11.7–16.1)
HGB BLD-MCNC: 13.1 G/DL — SIGNIFICANT CHANGE UP (ref 11.5–15.5)
IANC: 3.83 K/UL — SIGNIFICANT CHANGE UP (ref 1.8–7.4)
IMM GRANULOCYTES NFR BLD AUTO: 0.3 % — SIGNIFICANT CHANGE UP (ref 0–0.9)
INR BLD: 1.05 RATIO — SIGNIFICANT CHANGE UP (ref 0.88–1.16)
LACTATE BLDV-MCNC: 1.5 MMOL/L — SIGNIFICANT CHANGE UP (ref 0.5–2)
LYMPHOCYTES # BLD AUTO: 2.91 K/UL — SIGNIFICANT CHANGE UP (ref 1–3.3)
LYMPHOCYTES # BLD AUTO: 37 % — SIGNIFICANT CHANGE UP (ref 13–44)
MAGNESIUM SERPL-MCNC: 1.7 MG/DL — SIGNIFICANT CHANGE UP (ref 1.6–2.6)
MCHC RBC-ENTMCNC: 30 PG — SIGNIFICANT CHANGE UP (ref 27–34)
MCHC RBC-ENTMCNC: 32.9 GM/DL — SIGNIFICANT CHANGE UP (ref 32–36)
MCV RBC AUTO: 91.3 FL — SIGNIFICANT CHANGE UP (ref 80–100)
MONOCYTES # BLD AUTO: 0.98 K/UL — HIGH (ref 0–0.9)
MONOCYTES NFR BLD AUTO: 12.5 % — SIGNIFICANT CHANGE UP (ref 2–14)
NEUTROPHILS # BLD AUTO: 3.83 K/UL — SIGNIFICANT CHANGE UP (ref 1.8–7.4)
NEUTROPHILS NFR BLD AUTO: 48.7 % — SIGNIFICANT CHANGE UP (ref 43–77)
NRBC # BLD: 0 /100 WBCS — SIGNIFICANT CHANGE UP (ref 0–0)
NRBC # FLD: 0 K/UL — SIGNIFICANT CHANGE UP (ref 0–0)
NT-PROBNP SERPL-SCNC: 395 PG/ML — HIGH
PCO2 BLDV: 36 MMHG — LOW (ref 39–52)
PH BLDV: 7.42 — SIGNIFICANT CHANGE UP (ref 7.32–7.43)
PLATELET # BLD AUTO: 284 K/UL — SIGNIFICANT CHANGE UP (ref 150–400)
PO2 BLDV: 42 MMHG — SIGNIFICANT CHANGE UP (ref 25–45)
POTASSIUM BLDV-SCNC: 3.4 MMOL/L — LOW (ref 3.5–5.1)
POTASSIUM SERPL-MCNC: 3.8 MMOL/L — SIGNIFICANT CHANGE UP (ref 3.5–5.3)
POTASSIUM SERPL-SCNC: 3.8 MMOL/L — SIGNIFICANT CHANGE UP (ref 3.5–5.3)
PROT SERPL-MCNC: 6.2 G/DL — SIGNIFICANT CHANGE UP (ref 6–8.3)
PROTHROM AB SERPL-ACNC: 12.2 SEC — SIGNIFICANT CHANGE UP (ref 10.5–13.4)
RBC # BLD: 4.36 M/UL — SIGNIFICANT CHANGE UP (ref 3.8–5.2)
RBC # FLD: 11.9 % — SIGNIFICANT CHANGE UP (ref 10.3–14.5)
RH IG SCN BLD-IMP: POSITIVE — SIGNIFICANT CHANGE UP
SAO2 % BLDV: 68.9 % — SIGNIFICANT CHANGE UP (ref 67–88)
SODIUM SERPL-SCNC: 140 MMOL/L — SIGNIFICANT CHANGE UP (ref 135–145)
T3 SERPL-MCNC: 414 NG/DL — HIGH (ref 80–200)
T4 AB SER-ACNC: 16.58 UG/DL — HIGH (ref 5.1–13)
TROPONIN T, HIGH SENSITIVITY RESULT: 19 NG/L — SIGNIFICANT CHANGE UP
TSH SERPL-MCNC: <0.1 UIU/ML — LOW (ref 0.27–4.2)
WBC # BLD: 7.86 K/UL — SIGNIFICANT CHANGE UP (ref 3.8–10.5)
WBC # FLD AUTO: 7.86 K/UL — SIGNIFICANT CHANGE UP (ref 3.8–10.5)

## 2023-06-22 PROCEDURE — 99285 EMERGENCY DEPT VISIT HI MDM: CPT

## 2023-06-22 PROCEDURE — 71045 X-RAY EXAM CHEST 1 VIEW: CPT | Mod: 26

## 2023-06-22 PROCEDURE — 93010 ELECTROCARDIOGRAM REPORT: CPT

## 2023-06-22 RX ORDER — ASPIRIN/CALCIUM CARB/MAGNESIUM 324 MG
324 TABLET ORAL ONCE
Refills: 0 | Status: COMPLETED | OUTPATIENT
Start: 2023-06-22 | End: 2023-06-22

## 2023-06-22 RX ORDER — METHIMAZOLE 10 MG/1
0.5 TABLET ORAL
Qty: 20 | Refills: 0
Start: 2023-06-22

## 2023-06-22 RX ORDER — METOPROLOL TARTRATE 50 MG
1 TABLET ORAL
Qty: 20 | Refills: 0
Start: 2023-06-22

## 2023-06-22 RX ORDER — DILTIAZEM HCL 120 MG
30 CAPSULE, EXT RELEASE 24 HR ORAL ONCE
Refills: 0 | Status: COMPLETED | OUTPATIENT
Start: 2023-06-22 | End: 2023-06-22

## 2023-06-22 RX ORDER — SODIUM CHLORIDE 9 MG/ML
1000 INJECTION INTRAMUSCULAR; INTRAVENOUS; SUBCUTANEOUS ONCE
Refills: 0 | Status: COMPLETED | OUTPATIENT
Start: 2023-06-22 | End: 2023-06-22

## 2023-06-22 RX ORDER — DILTIAZEM HCL 120 MG
15 CAPSULE, EXT RELEASE 24 HR ORAL ONCE
Refills: 0 | Status: COMPLETED | OUTPATIENT
Start: 2023-06-22 | End: 2023-06-22

## 2023-06-22 RX ADMIN — SODIUM CHLORIDE 1000 MILLILITER(S): 9 INJECTION INTRAMUSCULAR; INTRAVENOUS; SUBCUTANEOUS at 19:35

## 2023-06-22 RX ADMIN — Medication 30 MILLIGRAM(S): at 20:27

## 2023-06-22 RX ADMIN — Medication 15 MILLIGRAM(S): at 20:00

## 2023-06-22 RX ADMIN — Medication 324 MILLIGRAM(S): at 22:16

## 2023-06-22 NOTE — ED PROVIDER NOTE - IV ALTEPLASE EXCL ABS HIDDEN
Pt said Dr. Garcia recommended that she stop taking Atenolol after her last appt.  She did that but had a lot of palpitations after stopping it.  She called to notify Dr. Garcia that she has resumed taking Atenolol and the palpitations have stopped.     show

## 2023-06-22 NOTE — ED ADULT NURSE NOTE - NSFALLUNIVINTERV_ED_ALL_ED
Bed/Stretcher in lowest position, wheels locked, appropriate side rails in place/Call bell, personal items and telephone in reach/Instruct patient to call for assistance before getting out of bed/chair/stretcher/Non-slip footwear applied when patient is off stretcher/[place] to call system/Physically safe environment - no spills, clutter or unnecessary equipment/Purposeful proactive rounding/Room/bathroom lighting operational, light cord in reach

## 2023-06-22 NOTE — ED PROVIDER NOTE - NSFOLLOWUPINSTRUCTIONS_ED_ALL_ED_FT
You had an EKG showing atrial fibrillation and flutter.  You converted to a normal rhythm prior to discharge.  Your elevated thyroid studies may have contributed to the rapid heart rhythm.  Resume methimazole 2.5mg daily. Call your endocrinologist, you will likely need to adjust this medication and have repeat bloodwork.   Take aspirin 81mg daily, and metoprolol 50mg daily.  Follow up with your primary doctor and  Endocrine. Return to Emergency for worsening pain, fever, weakness, numbness, palpitations, lightheadedness, dizziness, shortness of breath or any signs of distress.

## 2023-06-22 NOTE — ED PROVIDER NOTE - PATIENT PORTAL LINK FT
You can access the FollowMyHealth Patient Portal offered by [facility] by registering at the following website: [email or web address]. By joining [employer]’s FollowMyHealth portal, you will also be able to view your health information using other applications (apps) compatible with our system.

## 2023-06-22 NOTE — CHART NOTE - NSCHARTNOTEFT_GEN_A_CORE
Endocrine contacted regarding hyperthyroidism.   Per discussion with ED physician, patient is a 43yo F w/ hx of Graves Hyperthyroidism, previously on MMI but off for several months now, in remission & being routinely monitored. Last TFTs reportedly were normal. Presented to the ED for palpitation, initially started last week and also noted on her watch.   Found to be in afib/flutter with HR in 150s. Now back in sinus rhythm after diltiazem IVP & PO from chart review. Also received fluid bolus.   TSH now suppressed < 0.10.   LFTS & WBC wnl.   At present clinically stable, without other symptoms/signs of hyperthyroidism or concern for thyroid storm at this time per discussion with ED physician.   No Gi symptoms, tremors, increased temp, etc.     Vital Signs Last 24 Hrs  T(C): 36.4 (22 Jun 2023 19:26), Max: 36.4 (22 Jun 2023 19:26)  T(F): 97.5 (22 Jun 2023 19:26), Max: 97.5 (22 Jun 2023 19:26)  HR: 79 (22 Jun 2023 20:46) (79 - 160)  BP: 96/67 (22 Jun 2023 20:46) (96/57 - 124/75)  BP(mean): 77 (22 Jun 2023 20:46) (67 - 90)  RR: 14 (22 Jun 2023 20:46) (11 - 18)  SpO2: 100% (22 Jun 2023 20:46) (100% - 100%)    Parameters below as of 22 Jun 2023 20:46  Patient On (Oxygen Delivery Method): room air    Recommendations:   - check free thyroxine level and total T3 level   - if patient is being admitted, check US thyroid, Thyroid stimulating immunoglobulin & TSH receptor Ab   - Start low dose beta blocker with holding parameters for HR < 60 or SBP < 100 - HR at present well controlled, can send PRN for palpitations/tachycardia If being discharged from ED with same holding parameters; target HR 70-80 bpm  - Beta blocker will help to control sympathetic symptoms & to decrease peripheral conversion to active hormone   - consider cardiology eval - to be monitored in ED on cardiac monitor for at least a couple more hours to ensure patient remains in NSR per ED physician  - would hold off methimazole pending FT4/TT3 - will likely need to resume if FT4/TT3 both elevated as well & given hx of Graves dx  - Avoid CT iodinated contrast if possible as this can make hyperthyroidism worse  - Patient should have close follow up with PCP for monitoring of TFTs & continued management of hyperthyroidism as outpatient if being discharged tonight  - Recommend outpatient endocrinology follow up:     [facility]   [address] [facility] [address]  [facility] [id number] [phone]    - Formal consult to be completed tomorrow if patient is not discharged before then    recs disucssed with ED physician    Brit Vargas, DO   Endocrine Fellow  For follow up questions, discharge recommendations, or new consults please call answering service at [phone] (weekdays), [phone] (nights/weekends). For nonurgent matters, please email [email or web address] or [email or web address] contacted regarding hyperthyroidism.   Per discussion with ED physician, patient is a 41yo F w/ hx of Graves Hyperthyroidism, previously on MMI but off for several months now, in remission & being routinely monitored. Last TFTs reportedly were normal. Presented to the ED for palpitation, initially started last week and also noted on her watch.   Found to be in afib/flutter with HR in 150s. Now back in sinus rhythm after diltiazem IVP & PO from chart review. Also received fluid bolus.   TSH now suppressed < 0.10.   LFTS & WBC wnl.   At present clinically stable, without other symptoms/signs of hyperthyroidism or concern for thyroid storm at this time per discussion with ED physician.   No Gi symptoms, tremors, increased temp, etc.     Vital Signs Last 24 Hrs  T(C): 36.4 (22 Jun 2023 19:26), Max: 36.4 (22 Jun 2023 19:26)  T(F): 97.5 (22 Jun 2023 19:26), Max: 97.5 (22 Jun 2023 19:26)  HR: 79 (22 Jun 2023 20:46) (79 - 160)  BP: 96/67 (22 Jun 2023 20:46) (96/57 - 124/75)  BP(mean): 77 (22 Jun 2023 20:46) (67 - 90)  RR: 14 (22 Jun 2023 20:46) (11 - 18)  SpO2: 100% (22 Jun 2023 20:46) (100% - 100%)    Parameters below as of 22 Jun 2023 20:46  Patient On (Oxygen Delivery Method): room air    Recommendations:   - check free thyroxine level and total T3 level   - BW 35 given HR 150s (25pts) and Afib/Flutter (10pts) - suggestive of impending thyroid storm - would recommend at least continued monitoring in CDU  - if patient will be in CDU or is being admitted, check US thyroid, Thyroid stimulating immunoglobulin & TSH receptor Ab   - Start low dose beta blocker with holding parameters for HR < 60 or SBP < 100 - HR at present well controlled, can send as PRN Rx for palpitations/tachycardia If being discharged from ED with same holding parameters; titrate to target HR 70-80 bpm  - Beta blocker will help to control sympathetic symptoms & to decrease peripheral conversion to active hormone   - consider cardiology eval - to be monitored in ED on cardiac monitor for at least a couple more hours to ensure patient remains in NSR per ED physician  - would hold off methimazole pending FT4/TT3 - will likely need to resume if FT4/TT3 both elevated as well & given hx of Graves dx  - Avoid CT iodinated contrast if possible as this can make hyperthyroidism worse  - Patient should have close follow up with PCP for monitoring of TFTs & continued management of hyperthyroidism as outpatient if being discharged tonight  - Recommend outpatient endocrinology follow up:     [facility]   [address] [facility] [address]  [facility] [id number] [phone]    - Formal consult to be completed tomorrow if patient is not discharged before then    recs disucssed with ED physician    Brit Vargas,    Endocrine Fellow  For follow up questions, discharge recommendations, or new consults please call answering service at [phone] (weekdays), [phone] (nights/weekends). For nonurgent matters, please email [email or web address] or [email or web address]

## 2023-06-22 NOTE — ED PROVIDER NOTE - CLINICAL SUMMARY MEDICAL DECISION MAKING FREE TEXT BOX
41 y/o F, PMH of Graves Disease (not currently on meds), presents to ED c/o palpitations a/w lightheadedness/dizziness earlier today. Pt has no h/o irregular heart rate.    Pt presenting w/ HR in 150s, afebrile, BP normotensive. Physical exam significant for irregular heart rhythm. EKG appears to be Aflutter vs. SVT.    Plan to attempt vagal maneuver and likely rate control w/ diltizem if unsuccessful. Concern for hyperthyroidism given history, will also send TFTs. Reassess for dispo.

## 2023-06-22 NOTE — ED ADULT NURSE REASSESSMENT NOTE - NS ED NURSE REASSESS COMMENT FT1
PRECEPTOR RN: pt a&ox4 endorses relief after Cardizem. pt denies chest pain, sob, nausea, vomiting, dizziness, headache. fellow RN Iram made aware. afib on monitor. pt appears in NAD, respirations even and unlabored with no accessory muscle use. PRECEPTOR RN: report rcd from mobile critical care [facility]. pt a&ox4 endorses relief after Cardizem. pt denies chest pain, sob, nausea, vomiting, dizziness, headache. fellow RN Iram made aware. afib on monitor. pt appears in NAD, respirations even and unlabored with no accessory muscle use.

## 2023-06-22 NOTE — ED PROVIDER NOTE - OBJECTIVE STATEMENT
43 y/o F, PMH of Graves Disease (not currently on meds), presents to ED c/o palpitations a/w lightheadedness/dizziness earlier today. Pt's apple watch had alarmed that her heart rate was fast. Pt reports that while going through her apple watch log she was noted to have elevated heart rate on Monday as well, although at that time did not have symptoms. Pt denies h/o irregular heart rhythm. Pt denies f/c, CP, SOB, HA, vision changes, abd pain, n/v, urinary sx, weakness/numbness, or any other symptoms at this time.

## 2023-06-22 NOTE — ED PROVIDER NOTE - PHYSICAL EXAMINATION
PHYSICAL EXAM:  GENERAL: non-toxic appearing; in no respiratory distress  HEENT: Atraumatic, Normocephalic, PERRL, EOMs intact b/l w/out deficits, no conjunctival pallor, MMM  NECK: No JVD; FROM  CHEST/LUNG: CTAB no wheezes/rhonchi/rales  HEART: tachycardia, irregularly irregular, no murmur/gallops/rubs  ABDOMEN: +BS, soft, NT, ND  EXTREMITIES: No LE edema, +2 radial pulses b/l, +2 DP/PT pulses b/l  MUSCULOSKELETAL: FROM of all 4 extremities  NERVOUS SYSTEM:  A&Ox3, No motor deficits or sensory deficits; no focal neurologic deficits  SKIN:  No new rashes

## 2023-06-22 NOTE — ED PROVIDER NOTE - NS ED ROS FT
Gen: Denies fever, weight loss; +lightheadedness, +dizziness  HEENT: Denies vision changes, ear pain, epistaxis, sore throat  CV: Denies chest pain; +palpitations  Skin: Denies rash, erythema, color changes  Resp: Denies SOB, cough  Endo: Denies sensitivity to heat, cold, increased urination  GI: Denies abdominal pain, constipation, nausea, vomiting, diarrhea  Msk: Denies back pain, LE swelling, extremity pain  : Denies dysuria, increased frequency  Neuro: Denies LOC, weakness, numbness, tingling  Psych: Denies hx of psych, hallucinations  ROS statement: all other ROS negative except as per HPI

## 2023-06-22 NOTE — ED ADULT NURSE REASSESSMENT NOTE - NS ED NURSE REASSESS COMMENT FT1
Received report from Day RN, Pt came in due to elevated HR. Pt was tachycardic pt HR has decreased and is now 100. Pt states she feels a lot better then when she first got here. Pt denies any chest pain, headache, nausea, vomiting, diarrhea. fever or chills. Safety maintained.

## 2023-06-22 NOTE — ED ADULT NURSE REASSESSMENT NOTE - NS ED NURSE REASSESS COMMENT FT1
Mobile Critical Care RN: vasovagal maneuvers performed. HR decreased to 140s, repeat 12 lead EKG showing Afib with RVR. 15 mg cardizem IVP given with return to rate below 100. BP monitored and MAP remains above 65. plan for PO cardizem administration.

## 2023-06-22 NOTE — ED ADULT TRIAGE NOTE - MODE OF ARRIVAL
Walk in 5-Fu Counseling: 5-Fluorouracil Counseling:  I discussed with the patient the risks of 5-fluorouracil including but not limited to erythema, scaling, itching, weeping, crusting, and pain.

## 2023-06-22 NOTE — ED ADULT NURSE NOTE - OBJECTIVE STATEMENT
pt received to  tr C , a&ox4 , ambulatory , phx of graves disease  , p/w palpitation and SOB x 1 day. pt states all day she has felt tired and her apple watch kept alarming that her heart rate was fast  . Pt breathing even and unlabored on room air. Denies fever, chills, cough, chest pain, palpitations, dizziness, N/V/D, constipation, numbness, tingling. IV placed. Labs collected and sent. EKG in chart. pending MD Liu  .  pt educated on fall precautions and confirms understanding via teach back method. Stretcher locked in lowest position with siderails up x2. Call bell and personal items within reach.

## 2023-06-23 VITALS
HEART RATE: 80 BPM | OXYGEN SATURATION: 100 % | DIASTOLIC BLOOD PRESSURE: 80 MMHG | SYSTOLIC BLOOD PRESSURE: 104 MMHG | TEMPERATURE: 99 F | RESPIRATION RATE: 14 BRPM

## 2023-06-23 DIAGNOSIS — I48.92 UNSPECIFIED ATRIAL FLUTTER: ICD-10-CM

## 2023-06-23 DIAGNOSIS — Z91.89 OTHER SPECIFIED PERSONAL RISK FACTORS, NOT ELSEWHERE CLASSIFIED: ICD-10-CM

## 2023-06-23 DIAGNOSIS — E05.00 THYROTOXICOSIS WITH DIFFUSE GOITER WITHOUT THYROTOXIC CRISIS OR STORM: ICD-10-CM

## 2023-06-23 DIAGNOSIS — R73.9 HYPERGLYCEMIA, UNSPECIFIED: ICD-10-CM

## 2023-06-23 LAB — THYROPEROXIDASE AB SERPL-ACNC: 442 IU/ML — HIGH

## 2023-06-23 NOTE — CONSULT NOTE ADULT - SUBJECTIVE AND OBJECTIVE BOX
ENDOCRINE INITIAL CONSULT - graves disease    HPI:  Ms. Ponce is a 42 year old female with a PMHx of Graves disease who presents with palpitations, lightheadedness and dizziness.    ENDOCRINE HISTORY     Endocrinologist:  Social History:  Family History:  Surgical History:  Insurance:  Resides In:    PAST MEDICAL & SURGICAL HISTORY:  Graves disease      No significant past surgical history          FAMILY HISTORY:      Social History:      Home Medications:  Advil 200 mg oral tablet: 1 tab(s) orally every 6 hours, As Needed (08 Mar 2018 19:36)  albuterol 90 mcg/inh inhalation aerosol: 2 puff(s) inhaled 4 times a day, As Needed (08 Mar 2018 19:36)  ALPRAZolam 0.5 mg oral tablet: 1 tab(s) orally once a day, As Needed (08 Mar 2018 19:36)  Minastrin 24 Fe oral tablet, chewable: 1 tab(s) orally once a day (08 Mar 2018 19:36)  Zoloft 25 mg oral tablet: 1 tab(s) orally every other day (08 Mar 2018 19:36)      MEDICATIONS  (STANDING):    MEDICATIONS  (PRN):      Allergies    No Known Allergies    Intolerances        REVIEW OF SYSTEMS  Constitutional: No fever  Eyes: No blurry vision  Neuro: No tremors  HEENT: No pain  Cardiovascular: No chest pain, palpitations  Respiratory: No SOB, no cough  GI: No nausea, vomiting, abdominal pain  : No dysuria  Skin: no rash  Psych: no depression  Endocrine: no polyuria, polydipsia  Hem/lymph: no swelling  Osteoporosis: no fractures  ALL OTHER SYSTEMS REVIEWED AND NEGATIVE     UNABLE TO OBTAIN     PHYSICAL EXAM   Vital Signs Last 24 Hrs  T(C): 37 (23 Jun 2023 00:24), Max: 37 (23 Jun 2023 00:24)  T(F): 98.6 (23 Jun 2023 00:24), Max: 98.6 (23 Jun 2023 00:24)  HR: 80 (23 Jun 2023 00:24) (79 - 160)  BP: 104/80 (23 Jun 2023 00:24) (96/57 - 124/75)  BP(mean): 77 (22 Jun 2023 20:46) (67 - 90)  RR: 14 (23 Jun 2023 00:24) (11 - 18)  SpO2: 100% (23 Jun 2023 00:24) (100% - 100%)    Parameters below as of 23 Jun 2023 00:24  Patient On (Oxygen Delivery Method): room air      GENERAL: NAD, well-groomed, well-developed  EYES: No proptosis, no lid lag, anicteric  HEENT:  Atraumatic, Normocephalic, moist mucous membranes  THYROID: Normal size, no palpable nodules  RESPIRATORY: Clear to auscultation bilaterally; No rales, rhonchi, wheezing  CARDIOVASCULAR: Regular rate and rhythm; No murmurs; no peripheral edema  GI: Soft, nontender, non distended, normal bowel sounds  SKIN: Dry, intact, No rashes or lesions  MUSCULOSKELETAL: Full range of motion, normal strength  NEURO: sensation intact, extraocular movements intact, no tremor  PSYCH: Alert and oriented x 3, normal affect, normal mood  CUSHING'S SIGNS: no striae    CAPILLARY BLOOD GLUCOSE                                    13.1   7.86  )-----------( 284      ( 22 Jun 2023 19:54 )             39.8       06-22    140  |  107  |  15  ----------------------------<  124<H>  3.8   |  18<L>  |  0.48<L>    Ca    9.6      22 Jun 2023 19:54  Mg     1.70     06-22    TPro  6.2  /  Alb  3.9  /  TBili  0.2  /  DBili  x   /  AST  22  /  ALT  27  /  AlkPhos  93  06-22      Thyroid Stimulating Hormone, Serum: <0.10 uIU/mL (06-22-23 @ 19:54)      LIPIDS    RADIOLOGY

## 2023-06-23 NOTE — CONSULT NOTE ADULT - ASSESSMENT
Ms. Ponce is a 42 year old female with a PMHx of Graves disease who presents with palpitations, lightheadedness and dizziness. Endocrinology consulted for management of Graves disease.    #Graves disease  #Concern for thyroid storm  #Afib/flutter  - Patient presented with palpitations found to be in afib with RVR HR 150s  - BW 35 given HR 150s and afib --> concerning for impending thyroid storm  - TSH <0.1, serum T4 16.58, TT3 414, FT4 >5  - LFTs WNL, WBC 8.6  PLAN  - f/u TSI, TRAB  - beta blocker per primary team to titrate to HR 60-80  - daily LFTs    #Hyperglycemia  - patients BG noted to be elevated at 124 on serum BMP  PLAN  - check HbA1c    Discussed with primary team.     Thank you for the interesting consult.    Moi Mackay MD, Endocrinology Fellow  Pager [phone] from 9am to 5pm. After hours and on weekends, please call [phone].

## 2023-06-25 LAB — TSI ACT/NOR SER: 1.34 IU/L — HIGH (ref 0–0.55)

## 2023-06-25 NOTE — ED POST DISCHARGE NOTE - REASON FOR FOLLOW-UP
Thyroperoxidase antibody elevated. Patient contacted discussed with patient elevated level and most of her thyroid levels elevated or abnl. Patient has follow up tomorrow with Endocrinologist  and will have repeat testing. Discussed with patient need to return to ED if symptoms don't continue to improve or recur or develops any new or worsening symptoms that are of concern. Other

## 2023-07-31 PROBLEM — E05.00 THYROTOXICOSIS WITH DIFFUSE GOITER WITHOUT THYROTOXIC CRISIS OR STORM: Chronic | Status: ACTIVE | Noted: 2023-06-22

## 2023-08-25 ENCOUNTER — APPOINTMENT (OUTPATIENT)
Dept: ORTHOPEDIC SURGERY | Facility: CLINIC | Age: 43
End: 2023-08-25
Payer: COMMERCIAL

## 2023-08-25 PROCEDURE — 73562 X-RAY EXAM OF KNEE 3: CPT | Mod: LT,RT

## 2023-08-25 PROCEDURE — 99214 OFFICE O/P EST MOD 30 MIN: CPT | Mod: 25

## 2023-08-25 PROCEDURE — 20610 DRAIN/INJ JOINT/BURSA W/O US: CPT | Mod: LT,RT

## 2023-08-25 NOTE — DISCUSSION/SUMMARY
[Medication Risks Reviewed] : Medication risks reviewed [Surgical risks reviewed] : Surgical risks reviewed [de-identified] : Treatment options were reviewed in detail with the patient including conservative versus surgical management.  At this point in time patient would like to continue with conservative management as she is beginning the school year and then would like to discuss the surgery in the future.  She has tried viscosupplementation without improvement.  Would try a repeat cortisone injection today.  Risk benefits and alternatives to cortisone were reviewed in detail with the patient including postinjection care.  She may continue with anti-inflammatory as needed for pain.  She will participate in activities within tolerances.  Patient well tolerated the injections.  All patient's questions and concerns were addressed satisfaction.  Any new or worsening symptoms arise advised patient to call the office.

## 2023-08-25 NOTE — PHYSICAL EXAM
[LE] : Sensory: Intact in bilateral lower extremities [DP] : dorsalis pedis 2+ and symmetric bilaterally [Normal RLE] : Right Lower Extremity: No scars, rashes, lesions, ulcers, skin intact [Normal LLE] : Left Lower Extremity: No scars, rashes, lesions, ulcers, skin intact [Normal Touch] : sensation intact for touch [Normal] : no peripheral adenopathy appreciated [de-identified] : On physical exam of the left knee skin is warm and dry without erythema ecchymosis signs or symptoms of infection superficial or deep.  There is no tenderness to palpation throughout the knee joint.  There is no palpable effusion.  Range of motion is 0-140 without pain or stiffness. 2+ crepitus about the patella.  The knee is stable with varus and valgus stress.  There is a negative anterior posterior drawer.  Sensation is intact throughout the distal lower extremity.  Strength is well-maintained in all planes.  There is negative Homans exam.  2+ dorsalis pedis pulse.  On physical exam of the right knee skin is warm and dry without erythema ecchymosis signs or symptoms of infection superficial or deep.  There is no tenderness to palpation throughout the knee joint.  There is no palpable effusion.  Range of motion is 0-140 without pain or stiffness. 2+ crepitus about the patella.  The knee is stable with varus and valgus stress.  There is a negative anterior posterior drawer.  Sensation is intact throughout the distal lower extremity.  Strength is well-maintained in all planes.  There is negative Homans exam.  2+ dorsalis pedis pulse. [de-identified] : X-rays of b/l knees show no evidence of fracture, normal anatomic alignment. Grade 2/3 arthritis in the patellofemoral compartment b/l.

## 2023-08-25 NOTE — END OF VISIT
[FreeTextEntry3] : I Dr. Montalvo, personally performed the evaluation and management services for this established patient who present today with a new problem/exacerbation of an existing condition. The E/M includes conducting the examination, assessing all new/exacerbated conditions, and establishing a new plan of care. Today, My ACP was here to assist in my evaluation and management services of this new problem/exacerbated condition to be followed going forward.

## 2023-08-25 NOTE — REASON FOR VISIT
[Follow-Up Visit] : a follow-up visit for [Knee Pain] : knee pain [FreeTextEntry2] : Bilateral Knee Pain

## 2023-08-25 NOTE — PROCEDURE
[FreeTextEntry1] : Procedure: Injection of bilateral knee joint. Indication:  Osteoarthritis and Joint pain. Risk, benefits and alternatives were discussed with the patient. Potential complications include bleeding, infection and allergic reaction. Verbal consent was obtained prior to the procedure. Alcohol was used to prep the area. ethyl chloride spray was used as a topical anesthetic. Using sterile technique, the aspiration/injection needle was then directed from a lateral aspect. A 22-gauge was used to inject 3 mL of 1% Lidocaine and 2 mL of 40mg/mL methylprednisolone. A bandage was applied. The patient tolerated the procedure well and the patient had a vasovagal episode which resolved. Complications: none. Patient instructed to avoid strenuous activity for 1-2 day(s).

## 2023-09-21 ENCOUNTER — APPOINTMENT (OUTPATIENT)
Dept: PSYCHIATRY | Facility: CLINIC | Age: 43
End: 2023-09-21
Payer: COMMERCIAL

## 2023-09-21 PROCEDURE — 99214 OFFICE O/P EST MOD 30 MIN: CPT | Mod: 95

## 2024-01-26 NOTE — ED ADULT TRIAGE NOTE - CHIEF COMPLAINT QUOTE
[de-identified] : Marry has symptomatic DJD in both knees. She received cortisone in both knees today. She will increase her activities as tolerated. we will see her back on an as needed basis. She will call if any issues morales denies any dysuria

## 2024-02-15 ENCOUNTER — APPOINTMENT (OUTPATIENT)
Dept: ORTHOPEDIC SURGERY | Facility: CLINIC | Age: 44
End: 2024-02-15

## 2024-02-27 RX ORDER — HYLAN G-F 20 16MG/2ML
48 SYRINGE (ML) INTRAARTICULAR
Qty: 2 | Refills: 0 | Status: ACTIVE | OUTPATIENT
Start: 2024-02-26

## 2024-03-05 RX ORDER — HYALURONATE SODIUM, STABILIZED 60 MG/3 ML
60 SYRINGE (ML) INTRAARTICULAR
Qty: 2 | Refills: 0 | Status: ACTIVE | OUTPATIENT
Start: 2024-02-27

## 2024-03-18 ENCOUNTER — APPOINTMENT (OUTPATIENT)
Dept: ORTHOPEDIC SURGERY | Facility: CLINIC | Age: 44
End: 2024-03-18
Payer: COMMERCIAL

## 2024-03-18 ENCOUNTER — NON-APPOINTMENT (OUTPATIENT)
Age: 44
End: 2024-03-18

## 2024-03-18 VITALS
HEART RATE: 96 BPM | DIASTOLIC BLOOD PRESSURE: 89 MMHG | BODY MASS INDEX: 21.69 KG/M2 | WEIGHT: 135 LBS | SYSTOLIC BLOOD PRESSURE: 135 MMHG | HEIGHT: 66 IN

## 2024-03-18 DIAGNOSIS — M17.12 UNILATERAL PRIMARY OSTEOARTHRITIS, LEFT KNEE: ICD-10-CM

## 2024-03-18 DIAGNOSIS — M17.11 UNILATERAL PRIMARY OSTEOARTHRITIS, RIGHT KNEE: ICD-10-CM

## 2024-03-18 PROCEDURE — 20610 DRAIN/INJ JOINT/BURSA W/O US: CPT | Mod: 50

## 2024-03-18 PROCEDURE — 99214 OFFICE O/P EST MOD 30 MIN: CPT | Mod: NC

## 2024-03-18 RX ORDER — HYALURONATE SODIUM, STABILIZED 60 MG/3 ML
60 SYRINGE (ML) INTRAARTICULAR
Refills: 0 | Status: COMPLETED | OUTPATIENT
Start: 2024-03-18

## 2024-03-18 RX ORDER — LIDOCAINE HYDROCHLORIDE 10 MG/ML
1 INJECTION, SOLUTION INFILTRATION; PERINEURAL
Refills: 0 | Status: COMPLETED | OUTPATIENT
Start: 2024-03-18

## 2024-03-18 RX ADMIN — Medication 0 MG/3ML: at 00:00

## 2024-03-18 RX ADMIN — LIDOCAINE HYDROCHLORIDE %: 10 INJECTION, SOLUTION INFILTRATION; PERINEURAL at 00:00

## 2024-03-19 RX ORDER — LIDOCAINE HYDROCHLORIDE 10 MG/ML
1 INJECTION, SOLUTION INFILTRATION; PERINEURAL
Refills: 0 | Status: COMPLETED | OUTPATIENT
Start: 2024-03-18

## 2024-03-19 RX ORDER — HYALURONATE SODIUM, STABILIZED 60 MG/3 ML
60 SYRINGE (ML) INTRAARTICULAR
Qty: 0 | Refills: 0 | Status: COMPLETED | OUTPATIENT
Start: 2024-03-18

## 2024-04-03 NOTE — DISCUSSION/SUMMARY
[de-identified] : After a thorough history, full evaluation and review of x-rays.  It was explained to the patient that there is osteoarthritic changes in both knees.  The patient was explained the different modalities of treatment which include conservative versus surgical intervention.  The patient was offered an injection here in the office today for both knees.  Prior to the injection. The patient was explained the risks, benefits, pros and cons, as well as alternatives.  It was explained that there is no guarantee for relief following the injection; and that this is not a cure for the osteoarthritis.  It was also explained to the patient that if there is any relief, this may be short-lived, as the pain may return.  This was explained in great detail in which the patient fully understood and agreed to the injection.  Therefore, it was given under sterile conditions without any complications.    The patient was also advised to continue with conservative management.  This includes a good exercise program consisting of isometric,range of motion and strengthening exercises. It is also advised to continue with ice packs, moist heat and pain medications in the form of anti-inflammatories.  The patient is advised to continue with these conservative measures and to return back to the office in another month or 2 for reevaluation and management.  However, if there is any increase in pain, redness, swelling, heat, discharge or fever.  The patient is advised to notify the office immediately.  35 minutes were spent, face to face, in direct consultation with the patient. This includes reviewing the natural history of their Dx., eliciting the history, performing an orthopedic exam, review of the x-ray findings, forming a differential Dx and discussing all treatment options. This Includes both surgical and non-surgical treatments. I also reviewed all the risks and benefits of non-operative & operative Tx options, future impact into orthopedic functions/problems, activity restrictions both at home and at work, and all follow up requirements.

## 2024-04-03 NOTE — END OF VISIT
[FreeTextEntry3] : I, Dr. Maxim Montalvo MD, personally performed the evaluation and management services for this established patient who presented today with a new problem/exacerbation of an existing condition. That E/M includes conducting the examination, assessing all new/exacerbated conditions, and establishing a new plan of care. Today, MY ACP was here to assist with recording the history in my evaluation and management services of this new problem/exacerbated condition to be followed going forward.

## 2024-04-03 NOTE — PHYSICAL EXAM
[Antalgic] : antalgic [LE] : Sensory: Intact in bilateral lower extremities [ALL] : dorsalis pedis, posterior tibial, femoral, popliteal, and radial 2+ and symmetric bilaterally [de-identified] : On physical examination of both knees.  The skin is clean dry and intact with no areas of redness swelling or heat noted.  There is some diffuse pain and tenderness but mostly in the patellofemoral compartments.  The injection was administered while she was in a seated position.  She tolerated it well. [de-identified] : No x-rays were performed today

## 2024-04-03 NOTE — HISTORY OF PRESENT ILLNESS
[de-identified] : Patient is a 43-year-old female who presents today for an evaluation for both knees.  She has been seen in the past and explained that she does have some osteoarthritic changes.  Particularly in the patellofemoral compartments.  She has tried viscosupplementation's in the past with mild short-term pain relief.  But would like to receive an additional round of injections.  She denies any history of injury or accident.  States that it is worse with bending her knees when trying to stand from a seated position such as getting out of a car.

## 2024-04-03 NOTE — REASON FOR VISIT
[Follow-Up Visit] : a follow-up visit for [Knee Pain] : knee pain [FreeTextEntry2] : Bilateral primary osteoarthritis

## 2024-04-03 NOTE — PROCEDURE
[Injection] : Injection [Bilateral] : of bilateral [Knee Joint] : knee joint [Patient] : patient [Osteoarthritis] : Osteoarthritis [Risk] : risk [Benefits] : benefits [Alternatives] : alternatives [Bleeding] : bleeding [Infection] : infection [Allergic Reaction] : allergic reaction [Verbal Consent Obtained] : verbal consent was obtained prior to the procedure [Alcohol] : Alcohol [Medial] : medial [Ethyl Chloride Spray] : ethyl chloride spray was used as a topical anesthetic [22] : a 22-gauge [Anterior] : anterior [1% Lidocaine___(mL)] : [unfilled] mL of 1% Lidocaine [Bandage Applied] : a bandage [Tolerated Well] : The patient tolerated the procedure well [None] : none [FreeTextEntry8] : Gel 1

## 2024-04-04 ENCOUNTER — APPOINTMENT (OUTPATIENT)
Dept: PSYCHIATRY | Facility: CLINIC | Age: 44
End: 2024-04-04
Payer: COMMERCIAL

## 2024-04-04 DIAGNOSIS — F32.A DEPRESSION, UNSPECIFIED: ICD-10-CM

## 2024-04-04 DIAGNOSIS — F41.9 ANXIETY DISORDER, UNSPECIFIED: ICD-10-CM

## 2024-04-04 DIAGNOSIS — F41.0 PANIC DISORDER [EPISODIC PAROXYSMAL ANXIETY]: ICD-10-CM

## 2024-04-04 PROCEDURE — 99214 OFFICE O/P EST MOD 30 MIN: CPT | Mod: 95

## 2024-04-04 RX ORDER — ESCITALOPRAM OXALATE 10 MG/1
10 TABLET ORAL DAILY
Qty: 90 | Refills: 1 | Status: ACTIVE | COMMUNITY
Start: 2022-10-10 | End: 1900-01-01

## 2024-04-04 RX ORDER — GABAPENTIN 600 MG/1
600 TABLET, COATED ORAL
Qty: 90 | Refills: 0 | Status: DISCONTINUED | COMMUNITY
Start: 2022-10-10 | End: 2024-04-04

## 2024-04-04 NOTE — PHYSICAL EXAM
[None] : none [Cooperative] : cooperative [Anxious] : anxious [Clear] : clear [Linear/Goal Directed] : linear/goal directed [Average] : average [WNL] : within normal limits [FreeTextEntry8] : Mild dysphoria [de-identified] : Constricted

## 2024-04-04 NOTE — FAMILY HISTORY
[FreeTextEntry1] : Patient born in [facility] mother age 80 Father  when the patient was 1 of [place]'s.  Brother  family of [place] origin does not know much about family psychiatric history is nobody ever talked about feelings and they did not believe in medication.  No drug and alcohol history in the family no abuse history growing up.

## 2024-04-04 NOTE — SOCIAL HISTORY
[FreeTextEntry1] : Patient grew up in [place].  She went to [employer] graduating in 1996.  High school was horrible it was a city school it was very dangerous she had a lot of friends she played volleyball dance and soccer she was a good student.  She then went to [employer] then transferred to [employer] graduating in 2000 with a degree in media in English.  Her GPA was 3.8.  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  Patient was  in 2006.  She then decided she did not want to be in television anymore went back to [employer] where she got a masters in education and then a degree in special education she also worked as a  to put herself through school.

## 2024-04-04 NOTE — DISCUSSION/SUMMARY
[FreeTextEntry1] : 43-year-old female with anxiety panic depression.  Plan continue Lexapro 10 mg Xanax 0.5 mg as needed.  Follow-up in 6 months.

## 2024-04-04 NOTE — REASON FOR VISIT
[Patient preference] : as per patient preference [Telehealth (audio & video) - Individual/Group] : This visit was provided via telehealth using real-time 2-way audio visual technology. [Medical Office: ([facility])___] : The provider was located at the medical office in [unfilled]. [Home] : The patient, [unfilled], was located at home, [unfilled], at the time of the visit. [Patient] : Patient [FreeTextEntry1] : Anxiety

## 2024-07-16 ENCOUNTER — NON-APPOINTMENT (OUTPATIENT)
Age: 44
End: 2024-07-16

## 2024-07-18 ENCOUNTER — APPOINTMENT (OUTPATIENT)
Dept: ORTHOPEDIC SURGERY | Facility: CLINIC | Age: 44
End: 2024-07-18
Payer: COMMERCIAL

## 2024-07-18 VITALS
BODY MASS INDEX: 21.69 KG/M2 | DIASTOLIC BLOOD PRESSURE: 83 MMHG | HEIGHT: 66 IN | SYSTOLIC BLOOD PRESSURE: 122 MMHG | HEART RATE: 78 BPM | WEIGHT: 135 LBS

## 2024-07-18 DIAGNOSIS — S63.633A SPRAIN OF INTERPHALANGEAL JOINT OF LEFT MIDDLE FINGER, INITIAL ENCOUNTER: ICD-10-CM

## 2024-07-18 PROCEDURE — 99213 OFFICE O/P EST LOW 20 MIN: CPT

## 2024-12-10 ENCOUNTER — APPOINTMENT (OUTPATIENT)
Dept: ORTHOPEDIC SURGERY | Facility: CLINIC | Age: 44
End: 2024-12-10

## 2024-12-16 ENCOUNTER — APPOINTMENT (OUTPATIENT)
Dept: ORTHOPEDIC SURGERY | Facility: CLINIC | Age: 44
End: 2024-12-16
Payer: COMMERCIAL

## 2024-12-16 DIAGNOSIS — M17.12 UNILATERAL PRIMARY OSTEOARTHRITIS, LEFT KNEE: ICD-10-CM

## 2024-12-16 DIAGNOSIS — M17.11 UNILATERAL PRIMARY OSTEOARTHRITIS, RIGHT KNEE: ICD-10-CM

## 2024-12-16 PROCEDURE — 20610 DRAIN/INJ JOINT/BURSA W/O US: CPT | Mod: 50

## 2024-12-16 PROCEDURE — 73562 X-RAY EXAM OF KNEE 3: CPT | Mod: 50

## 2024-12-16 PROCEDURE — 99214 OFFICE O/P EST MOD 30 MIN: CPT | Mod: 25

## 2024-12-16 RX ORDER — LIDOCAINE HYDROCHLORIDE 10 MG/ML
1 INJECTION, SOLUTION INFILTRATION; PERINEURAL
Refills: 0 | Status: COMPLETED | OUTPATIENT
Start: 2024-12-16

## 2024-12-16 RX ORDER — METHYLPRED ACET/NACL,ISO-OS/PF 40 MG/ML
40 VIAL (ML) INJECTION
Refills: 0 | Status: COMPLETED | OUTPATIENT
Start: 2024-12-16

## 2024-12-16 RX ADMIN — METHYLPREDNISOLONE ACETATE 2 MG/ML: 40 INJECTION, SUSPENSION INTRA-ARTICULAR; INTRALESIONAL; INTRAMUSCULAR; SOFT TISSUE at 00:00

## 2024-12-16 RX ADMIN — LIDOCAINE HYDROCHLORIDE 3 %: 10 INJECTION, SOLUTION INFILTRATION; PERINEURAL at 00:00

## 2025-02-04 ENCOUNTER — APPOINTMENT (OUTPATIENT)
Dept: ORTHOPEDIC SURGERY | Facility: CLINIC | Age: 45
End: 2025-02-04

## 2025-03-19 NOTE — DISCHARGE NOTE ADULT - MEDICATION SUMMARY - MEDICATIONS TO STOP TAKING
no edema,  no murmurs,  regular rate and rhythm
I will STOP taking the medications listed below when I get home from the hospital:  None

## 2025-03-20 ENCOUNTER — NON-APPOINTMENT (OUTPATIENT)
Age: 45
End: 2025-03-20

## 2025-04-26 ENCOUNTER — APPOINTMENT (OUTPATIENT)
Dept: ORTHOPEDIC SURGERY | Facility: CLINIC | Age: 45
End: 2025-04-26